# Patient Record
Sex: MALE | Race: WHITE | NOT HISPANIC OR LATINO | Employment: FULL TIME | URBAN - NONMETROPOLITAN AREA
[De-identification: names, ages, dates, MRNs, and addresses within clinical notes are randomized per-mention and may not be internally consistent; named-entity substitution may affect disease eponyms.]

---

## 2017-06-16 ENCOUNTER — CLINICAL SUPPORT (OUTPATIENT)
Dept: AUDIOLOGY | Facility: CLINIC | Age: 52
End: 2017-06-16

## 2017-06-16 DIAGNOSIS — H90.3 SENSORINEURAL HEARING LOSS, BILATERAL: Primary | ICD-10-CM

## 2017-06-16 NOTE — PROGRESS NOTES
STANDARD AUDIOMETRIC EVALUATION      Name:  Castillo Cobian  :  1965  Age:  51 y.o.  Date of Evaluation:  2017      HISTORY    Reason for visit:  Castillo Cobian is seen today for a hearing evaluation at the request of [facility] # [id number].  Patient reports he has a history of hearing loss from the service and has worn hearing aids in the past.  He states he has nerve damage in his left ear which he thinks has gotten worse.  He states his hearing aids quit a couple years ago and he is seeking new hearing aids at this time.       EVALUATION    See Audiogram    RESULTS        Otoscopy and Tympanometry 226 Hz :  Right Ear:  Otoscopy:  Clear ear canal          Tympanometry:  Middle ear function within normal limits    Left Ear:   Otoscopy:  Clear ear canal        Tympanometry:  Middle ear function within normal limits    Test technique:  Standard Audiometry     Pure Tone Audiometry:   Patient responded to pure tones at 15-55 dB for 250-8000 Hz in right ear, and at 20-70 dB for 250-8000 Hz in left ear.       Speech Audiometry:        Right Ear:  Speech Reception Threshold (SRT) was obtained at 20 dBHL                 Speech Discrimination scores were 96% in quiet when words were presented at 60 dBHL       Left Ear:  Speech Reception Threshold (SRT) was obtained at 20 dBHL                 Speech Discrimination scores were 88% in quiet when words were presented at 60 dBHL    Reliability:   good    IMPRESSIONS:  1.  Tympanometry results are consistent with Middle ear function within normal limits in both ears.  2.  Pure tone results are consistent with normal to moderate sloping sensorineural hearing loss for both ears.       RECOMMENDATIONS:  Test results were reviewed with patient, and all questions were answered at this time.  It was a pleasure seeing Castillo Cobian in Audiology today.  We would be happy to do further testing or discuss these test as necessary. My thanks to [facility] for suggesting that   Mango come to our department for his hearing needs.           This document has been electronically signed by Mignon Subramanian MS CCC-A on June 16, 2017 11:22 AM       Mignon Subramanian MS CCC-A  Licensed Audiologist

## 2017-08-22 ENCOUNTER — CLINICAL SUPPORT (OUTPATIENT)
Dept: AUDIOLOGY | Facility: CLINIC | Age: 52
End: 2017-08-22

## 2017-08-22 DIAGNOSIS — Z71.89 ENCOUNTER FOR HEARING AID CONSULTATION: Primary | ICD-10-CM

## 2017-08-22 PROCEDURE — HEARINGNOCHG: Performed by: AUDIOLOGIST

## 2017-08-29 NOTE — PROGRESS NOTES
HEARING AID CONSULT    Name:  Castillo Cobian  :  1965  Age:  51 y.o.  Date of Evaluation:  2017      HISTORY    Reason for visit:  Castillo Cobian is seen today for a hearing aid consultation at the request of [facility].  A previous audiogram completed on 2017 shows normal to moderate sensorineural hearing loss bilaterally.  Patient reports he has nerve damage and ringing in the ears.    Hearing aid history:  Patient currently does not have hearing aids.      RECOMMENDATIONS:  Amplification options were discussed.  During the Hearing Aid discussion, Mr. Cobian has chosen 2  in the Canal (JOEL) hearing aid(s) for both ears.  The hearing aid recommended is from [employer] with the InnomiNet B90 312 T digital circuit.  He is also interested in a  One II remote control to go with the aids.  This information will be forwarded to the [facility] for authorization of hearing aids and remote control.  Once authorization has been obtained and hearing aids have been received, Mr. Cobian will be contacted to make an appointment for his fitting.         It was a pleasure seeing Castillo Cobian in Audiology today.  I look forward to helping Mr. Cobian with his amplification needs.            This document has been electronically signed by Mignon Subramanian MS CCC-GÓMEZ on 2017 4:06 PM       Mignon Subramanian MS CCC-A  Licensed Audiologist    For Billing and Coding:  Z71.89  Encounter for Hearing Aid Consultation - no charge

## 2017-10-03 ENCOUNTER — CLINICAL SUPPORT (OUTPATIENT)
Dept: AUDIOLOGY | Facility: CLINIC | Age: 52
End: 2017-10-03

## 2017-10-03 DIAGNOSIS — Z46.1 ENCOUNTER FOR FITTING OR ADJUSTMENT OF HEARING AID: Primary | ICD-10-CM

## 2017-10-03 PROCEDURE — V5160 DISPENSING FEE BINAURAL: HCPCS | Performed by: AUDIOLOGIST

## 2017-10-05 NOTE — PROGRESS NOTES
HEARING AID FITTING    Name:  Castillo Cobian  :  1965  Age:  51 y.o.  Date of Evaluation:  10/5/2017      HISTORY    Reason for visit:  Castillo Cobian is seen today for a hearing aid fitting.  The hearing aids to be fit are  in the Canal (JOEL) hearing aids from [employer] with the OneTageo B90 312 T digital circuit.    Right Hearing Aid Serial Number:  [id number]  Left Hearing Aid Serial Number:  [id number]   One II Remote Control Serial Number: [id number]    Warranty Expiration:  's warranty extends through 2020 which covers repairs, loss and damage.    Battery Size:  312    The hearing aids were fit to Mr. Cobian's ears.  Patient reported the fit was comfortable and the sound was good.  Real ear measurements were completed at this time which shows good benefit at soft, medium and loud input levels.     Patient was instructed on use and care of the hearing instruments.  The necessary paperwork was signed.  All questions were answered at this time.  [employer] has approved him for these hearing aids.  The cost of the hearing aids will be billed to his [employer] insurance.      Mr. Cobian will return in 3 weeks for a hearing aid follow up.  At that time we will make adjustments to the hearing aid(s) and address problems as necessary.      It was a pleasure seeing Castillo Cobian in Audiology today.  It is a pleasure helping Mr. Cobian with his amplification needs.             This document has been electronically signed by Mignon Subramanian MS CCC-GÓMEZ on 2017 8:56 AM        Mignon Subramanian MS CCC-A  Licensed Audiologist      For Billing and Coding:  The following charge billed to patient's [employer] insurance, # [id number]:    Dispensing Fee, Binaural - $1,100.00